# Patient Record
Sex: MALE | Race: WHITE | NOT HISPANIC OR LATINO | URBAN - METROPOLITAN AREA
[De-identification: names, ages, dates, MRNs, and addresses within clinical notes are randomized per-mention and may not be internally consistent; named-entity substitution may affect disease eponyms.]

---

## 2017-11-07 ENCOUNTER — IMPORTED ENCOUNTER (OUTPATIENT)
Dept: URBAN - METROPOLITAN AREA CLINIC 50 | Facility: CLINIC | Age: 67
End: 2017-11-07

## 2017-11-14 ENCOUNTER — IMPORTED ENCOUNTER (OUTPATIENT)
Dept: URBAN - METROPOLITAN AREA CLINIC 50 | Facility: CLINIC | Age: 67
End: 2017-11-14

## 2017-12-01 ENCOUNTER — IMPORTED ENCOUNTER (OUTPATIENT)
Dept: URBAN - METROPOLITAN AREA CLINIC 50 | Facility: CLINIC | Age: 67
End: 2017-12-01

## 2017-12-05 ENCOUNTER — IMPORTED ENCOUNTER (OUTPATIENT)
Dept: URBAN - METROPOLITAN AREA CLINIC 50 | Facility: CLINIC | Age: 67
End: 2017-12-05

## 2017-12-12 ENCOUNTER — IMPORTED ENCOUNTER (OUTPATIENT)
Dept: URBAN - METROPOLITAN AREA CLINIC 50 | Facility: CLINIC | Age: 67
End: 2017-12-12

## 2017-12-19 ENCOUNTER — IMPORTED ENCOUNTER (OUTPATIENT)
Dept: URBAN - METROPOLITAN AREA CLINIC 50 | Facility: CLINIC | Age: 67
End: 2017-12-19

## 2017-12-26 ENCOUNTER — IMPORTED ENCOUNTER (OUTPATIENT)
Dept: URBAN - METROPOLITAN AREA CLINIC 50 | Facility: CLINIC | Age: 67
End: 2017-12-26

## 2017-12-26 NOTE — PATIENT DISCUSSION
"""S/P IOL OD: Sensar AAB00 +22.50 (Target: Distance) +[place]. Continue post operative instructions and drops per schedule. "" ""Continue Pred - Nepafenac both eyes twice a day.  follow post sx drop schedule"""

## 2018-01-02 ENCOUNTER — IMPORTED ENCOUNTER (OUTPATIENT)
Dept: URBAN - METROPOLITAN AREA CLINIC 50 | Facility: CLINIC | Age: 68
End: 2018-01-02

## 2018-01-22 ENCOUNTER — IMPORTED ENCOUNTER (OUTPATIENT)
Dept: URBAN - METROPOLITAN AREA CLINIC 50 | Facility: CLINIC | Age: 68
End: 2018-01-22

## 2018-05-08 ENCOUNTER — IMPORTED ENCOUNTER (OUTPATIENT)
Dept: URBAN - METROPOLITAN AREA CLINIC 50 | Facility: CLINIC | Age: 68
End: 2018-05-08

## 2021-04-17 ASSESSMENT — VISUAL ACUITY
OD_OTHER: 20/100. 20/400.
OD_SC: 20/100
OS_BAT: 20/100
OS_BAT: 20/20
OS_PH: 20/100
OD_SC: 20/30
OD_SC: 20/100
OS_CC: J1+ -2
OS_CC: 20/200
OD_PH: 20/50
OS_SC: 20/30-
OS_SC: 20/20-1
OD_BAT: 20/20
OD_SC: 20/30
OD_BAT: 20/100
OD_CC: J1+ -2
OS_OTHER: 20/100. 20/100.
OD_CC: J10@ 17 IN
OS_BAT: 20/100
OD_SC: 20/100-
OS_OTHER: 20/20. 20/25.
OD_OTHER: 20/20. 20/25.
OD_PH: 20/25-1
OS_OTHER: 20/100. 20/400.
OS_SC: 20/30+2
OS_SC: 20/20-2
OD_BAT: 20/100
OS_SC: 20/200
OD_SC: 20/60-1
OS_SC: 20/200
OD_OTHER: 20/100. 20/400.
OD_OTHER: 20/100. 20/400.
OD_BAT: 20/100
OS_CC: J10@ 17 IN
OD_PH: 20/25+2
OD_SC: 20/30-2

## 2021-04-17 ASSESSMENT — TONOMETRY
OD_IOP_MMHG: 16
OD_IOP_MMHG: 13
OD_IOP_MMHG: 14
OS_IOP_MMHG: 16
OS_IOP_MMHG: 13
OS_IOP_MMHG: 16
OD_IOP_MMHG: 12
OS_IOP_MMHG: 13
OD_IOP_MMHG: 15
OS_IOP_MMHG: 13
OS_IOP_MMHG: 15
OS_IOP_MMHG: 25
OD_IOP_MMHG: 19
OD_IOP_MMHG: 16

## 2025-08-27 ENCOUNTER — NEW PATIENT (OUTPATIENT)
Age: 75
End: 2025-08-27

## 2025-08-27 DIAGNOSIS — D23.111: ICD-10-CM

## 2025-08-27 DIAGNOSIS — H35.373: ICD-10-CM

## 2025-08-27 DIAGNOSIS — H11.151: ICD-10-CM

## 2025-08-27 DIAGNOSIS — H04.123: ICD-10-CM

## 2025-08-27 PROCEDURE — 92134 CPTRZ OPH DX IMG PST SGM RTA: CPT

## 2025-08-27 PROCEDURE — 99204 OFFICE O/P NEW MOD 45 MIN: CPT
